# Patient Record
Sex: FEMALE | Race: WHITE
[De-identification: names, ages, dates, MRNs, and addresses within clinical notes are randomized per-mention and may not be internally consistent; named-entity substitution may affect disease eponyms.]

---

## 2021-09-23 ENCOUNTER — HOSPITAL ENCOUNTER (EMERGENCY)
Dept: HOSPITAL 8 - ED | Age: 66
Discharge: HOME | End: 2021-09-23
Payer: MEDICARE

## 2021-09-23 VITALS — HEIGHT: 59 IN | BODY MASS INDEX: 35.24 KG/M2 | WEIGHT: 174.83 LBS

## 2021-09-23 VITALS — DIASTOLIC BLOOD PRESSURE: 85 MMHG | SYSTOLIC BLOOD PRESSURE: 124 MMHG

## 2021-09-23 DIAGNOSIS — I10: ICD-10-CM

## 2021-09-23 DIAGNOSIS — J12.82: ICD-10-CM

## 2021-09-23 DIAGNOSIS — U07.1: Primary | ICD-10-CM

## 2021-09-23 DIAGNOSIS — E11.9: ICD-10-CM

## 2021-09-23 DIAGNOSIS — R50.9: ICD-10-CM

## 2021-09-23 LAB
ALBUMIN SERPL-MCNC: 2.9 G/DL (ref 3.4–5)
ALP SERPL-CCNC: 73 U/L (ref 45–117)
ALT SERPL-CCNC: 30 U/L (ref 12–78)
ANION GAP SERPL CALC-SCNC: 8 MMOL/L (ref 5–15)
BASOPHILS # BLD AUTO: 0 X10^3/UL (ref 0–0.1)
BASOPHILS NFR BLD AUTO: 1 % (ref 0–1)
BILIRUB SERPL-MCNC: 0.3 MG/DL (ref 0.2–1)
CALCIUM SERPL-MCNC: 8.5 MG/DL (ref 8.5–10.1)
CHLORIDE SERPL-SCNC: 113 MMOL/L (ref 98–107)
CREAT SERPL-MCNC: 1.18 MG/DL (ref 0.55–1.02)
EOSINOPHIL # BLD AUTO: 0 X10^3/UL (ref 0–0.4)
EOSINOPHIL NFR BLD AUTO: 0 % (ref 1–7)
ERYTHROCYTE [DISTWIDTH] IN BLOOD BY AUTOMATED COUNT: 13.3 % (ref 9.6–15.2)
LYMPHOCYTES # BLD AUTO: 0.9 X10^3/UL (ref 1–3.4)
LYMPHOCYTES NFR BLD AUTO: 18 % (ref 22–44)
MCH RBC QN AUTO: 31.9 PG (ref 27–34.8)
MCHC RBC AUTO-ENTMCNC: 33.8 G/DL (ref 32.4–35.8)
MONOCYTES # BLD AUTO: 0.5 X10^3/UL (ref 0.2–0.8)
MONOCYTES NFR BLD AUTO: 11 % (ref 2–9)
NEUTROPHILS # BLD AUTO: 3.3 X10^3/UL (ref 1.8–6.8)
NEUTROPHILS NFR BLD AUTO: 70 % (ref 42–75)
PLATELET # BLD AUTO: 219 X10^3/UL (ref 130–400)
PMV BLD AUTO: 8 FL (ref 7.4–10.4)
PROT SERPL-MCNC: 6.9 G/DL (ref 6.4–8.2)
RBC # BLD AUTO: 3.55 X10^6/UL (ref 3.82–5.3)

## 2021-09-23 PROCEDURE — 99285 EMERGENCY DEPT VISIT HI MDM: CPT

## 2021-09-23 PROCEDURE — 85025 COMPLETE CBC W/AUTO DIFF WBC: CPT

## 2021-09-23 PROCEDURE — 96365 THER/PROPH/DIAG IV INF INIT: CPT

## 2021-09-23 PROCEDURE — 96375 TX/PRO/DX INJ NEW DRUG ADDON: CPT

## 2021-09-23 PROCEDURE — 71045 X-RAY EXAM CHEST 1 VIEW: CPT

## 2021-09-23 PROCEDURE — 87635 SARS-COV-2 COVID-19 AMP PRB: CPT

## 2021-09-23 PROCEDURE — M0243 CASIRIVI AND IMDEVI INFUSION: HCPCS

## 2021-09-23 PROCEDURE — 96366 THER/PROPH/DIAG IV INF ADDON: CPT

## 2021-09-23 PROCEDURE — 96368 THER/DIAG CONCURRENT INF: CPT

## 2021-09-23 PROCEDURE — 36415 COLL VENOUS BLD VENIPUNCTURE: CPT

## 2021-09-23 PROCEDURE — 80053 COMPREHEN METABOLIC PANEL: CPT

## 2021-09-23 NOTE — NUR
PATIENT WALKED BACK FROM TRIAGE WITH CHIEF C/O "I THINK I HAVE THAT COVID-19." 
PATIENT REPORTS COUGH, FEVER AND LOSS OF TASTE AND SMELL X1 WEEK.  



NADN, CONNECTED TO MONITOR, VSS, CALL LIGHT WITHIN REACH.

## 2021-09-23 NOTE — NUR
PATIENT RESTING IN [facility], CONNECTED TO MONITOR, VSS.



ZITHROMAX STILL RUNNING, ABOUT 20 MINUTES LEFT. 



PATIENT TO BE DISCHARGED AFTER ABX DONE.

## 2021-09-23 NOTE — NUR
IV removed with tip intact. Patient given discharge instructions and 
prescription and they have confirmed that they understand the instructions.  
Patient ambulatory with steady gait. NAD, all questions answered appropriately, 
denies additional needs at this time. No personal belongings left in room after 
discharge.

## 2021-09-23 NOTE — NUR
PATIENT RESTING IN [facility], CONNECTED TO MONITOR, VSS, CALL LIGHT WITHIN 
REACH, NO FURTHER NEEDS AT THIS TIME.



PATIENT TO BE DISCHARGED AFTER ABX FINISH RUNNING.